# Patient Record
Sex: MALE | Race: WHITE
[De-identification: names, ages, dates, MRNs, and addresses within clinical notes are randomized per-mention and may not be internally consistent; named-entity substitution may affect disease eponyms.]

---

## 2018-11-27 ENCOUNTER — HOSPITAL ENCOUNTER (EMERGENCY)
Dept: HOSPITAL 25 - UCCORT | Age: 43
Discharge: HOME | End: 2018-11-27
Payer: COMMERCIAL

## 2018-11-27 VITALS — DIASTOLIC BLOOD PRESSURE: 79 MMHG | SYSTOLIC BLOOD PRESSURE: 122 MMHG

## 2018-11-27 DIAGNOSIS — F17.210: ICD-10-CM

## 2018-11-27 DIAGNOSIS — A49.02: ICD-10-CM

## 2018-11-27 DIAGNOSIS — Z88.0: ICD-10-CM

## 2018-11-27 DIAGNOSIS — Z79.01: ICD-10-CM

## 2018-11-27 DIAGNOSIS — I48.91: ICD-10-CM

## 2018-11-27 DIAGNOSIS — L02.412: Primary | ICD-10-CM

## 2018-11-27 PROCEDURE — G0463 HOSPITAL OUTPT CLINIC VISIT: HCPCS

## 2018-11-27 PROCEDURE — 10061 I&D ABSCESS COMP/MULTIPLE: CPT

## 2018-11-27 PROCEDURE — 99211 OFF/OP EST MAY X REQ PHY/QHP: CPT

## 2018-11-27 NOTE — UC
UC General HPI





- HPI Summary


HPI Summary: 





abscess in L armpit drained 2 days ago.


area not feeling better and is possible worse.


+MRSA on culture. No hx DM.





- History of Current Complaint


Chief Complaint: UCSkin


Stated Complaint: RECHECK-SKIN COMP


Time Seen by Provider: 11/27/18 15:26


Hx Obtained From: Patient


Pain Intensity: 6


Alleviating: nothing





- Allergy/Home Medications


Allergies/Adverse Reactions: 


 Allergies











Allergy/AdvReac Type Severity Reaction Status Date / Time


 


Penicillins Allergy  Hives Verified 11/27/18 15:06














PMH/Surg Hx/FS Hx/Imm Hx


Cardiovascular History: Atrial Fibrillation - ablation but still on medications 

for now





- Surgical History


Surgical History: Yes


Surgery Procedure, Year, and Place: jaw fxs, cardaic ablasion





- Family History


Known Family History: Positive: Hypertension, Non-Contributory





- Social History


Lives: With Family


Alcohol Use: None


Substance Use Type: None


Smoking Status (MU): Current Every Day Smoker


Type: Smokeless Tobacco


Amount Used/How Often: 1 can/2 days


Length of Time of Smoking/Using Tobacco: since age 29





- Immunization History


Vaccination Up to Date: Yes





Review of Systems


All Other Systems Reviewed And Are Negative: Yes


Constitutional: Positive: Negative


Skin: Positive: Negative


Eyes: Positive: Negative


ENT: Positive: Negative


Respiratory: Positive: Negative


Cardiovascular: Positive: Negative


Gastrointestinal: Positive: Negative


Genitourinary: Positive: Negative


Motor: Positive: Negative


Neurovascular: Positive: Negative


Musculoskeletal: Positive: Negative


Neurological: Positive: Negative


Psychological: Positive: Negative





Physical Exam


Triage Information Reviewed: Yes


Appearance: Well-Appearing


Vital Signs: 


 Initial Vital Signs











Temp  97.9 F   11/27/18 15:07


 


Pulse  81   11/27/18 15:07


 


Resp  16   11/27/18 15:07


 


BP  122/79   11/27/18 15:07


 


Pulse Ox  98   11/27/18 15:07











Vital Signs Reviewed: Yes


Eyes: Positive: Conjunctiva Clear


ENT: Positive: Normal ENT inspection


Neck: Positive: Supple, Nontender, No Lymphadenopathy


Respiratory: Positive: Lungs clear, Normal breath sounds


Cardiovascular: Positive: RRR, No Murmur


Abdomen Description: Positive: Nontender, No Organomegaly, Soft


Bowel Sounds: Positive: Present


Musculoskeletal: Positive: ROM Intact


Neurological: Positive: Alert


Psychological: Positive: Age Appropriate Behavior


Skin Exam: Normal, Other - L axilla red. Prior area of I&D open, pink and not 

draining. Central with tiny opening and is draining puss. 2 more superior spots 

are fluctuant and yellow c/w  abscesses. no streaking. satellite red spots.





Course/Dx





- Course


Course Of Treatment: Procedure: time out. L axilla prep betadine. local  with 1

% Lidocaine in 3 spots of concern. Tip #11 blade used to make superficial stabs 

to each site. small amounts of puss from 3 sites. irrigated with sterile NaCl 

to all 4 ( prior site included) sites. 1/4" gauze placed in all 4 sites. sites 

covered with Bacitracin and single bulky gauze sterile dressing. only scant 

bleeding. sterile technique used. pt tolerated well.  on Bactrim and will 

continue, culture is MRSA +





- Diagnoses


Provider Diagnosis: 


 Abscess of axilla








Discharge





- Sign-Out/Discharge


Documenting (check all that apply): Patient Departure


All imaging exams completed and their final reports reviewed: No Studies





- Discharge Plan


Condition: Stable


Disposition: HOME


Patient Education Materials:  Abscess Follow-up (ED)


Referrals: 


[facility] [Medical Doctor] - 2 Days


Additional Instructions: 


CONTINUE THE BACTRIM AS DIRECTED.





- Billing Disposition and Condition


Condition: STABLE


Disposition: Home





- Attestation Statements


Provider Attestation: 





I was available for consult. This patient was seen by the [facility]. The patient was 

not presented to, seen by, or examined by me. -Billy

## 2018-12-21 ENCOUNTER — HOSPITAL ENCOUNTER (EMERGENCY)
Dept: HOSPITAL 25 - UCCORT | Age: 43
Discharge: HOME | End: 2018-12-21
Payer: COMMERCIAL

## 2018-12-21 VITALS — SYSTOLIC BLOOD PRESSURE: 125 MMHG | DIASTOLIC BLOOD PRESSURE: 75 MMHG
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
## 2018-12-21 DIAGNOSIS — Z88.0: ICD-10-CM

## 2018-12-21 DIAGNOSIS — F17.210: ICD-10-CM

## 2018-12-21 DIAGNOSIS — S39.011A: Primary | ICD-10-CM

## 2018-12-21 PROCEDURE — 99213 OFFICE O/P EST LOW 20 MIN: CPT

## 2018-12-21 PROCEDURE — G0463 HOSPITAL OUTPT CLINIC VISIT: HCPCS

## 2018-12-21 NOTE — UC
General HPI





- HPI Summary


HPI Summary: 





"I think I have a groin pull".


2 days ago, while demonstrating a squat type move at his gym, pt got a sudden 

pain in his R groin.


he has a hx of the same.


at times the pain gets so bad it shoots into his leg.


pt denies any testicular pain or swelling.


self tx with tylenol. no relief. unable to take nsaids.





- History of Current Complaint


Chief Complaint: UCLowerExtremity


Stated Complaint: GROIN PAIN


Time Seen by Provider: 12/21/18 14:09


Hx Obtained From: Patient


Onset/Duration: Sudden Onset


Pain Intensity: 8


Aggravating: any type of leg movement


Associated Signs & Symptoms: Positive: Other - no joint pain.  Negative: 

Abdominal Pain





- Allergy/Home Medications


Allergies/Adverse Reactions: 


 Allergies











Allergy/AdvReac Type Severity Reaction Status Date / Time


 


Penicillins Allergy  Hives Verified 12/21/18 14:01














PMH/Surg Hx/FS Hx/Imm Hx





- Additional Past Medical History


Additional PMH: 





a-fib with ablation





- Surgical History


Surgical History: Yes


Surgery Procedure, Year, and Place: jaw fxs, cardaic ablasion





- Family History


Known Family History: Positive: Hypertension, Non-Contributory





- Social History


Occupation: Employed Full-time


Lives: With Family


Alcohol Use: None


Substance Use Type: None


Smoking Status (MU): Current Every Day Smoker


Type: Smokeless Tobacco


Amount Used/How Often: 1 can/2 days


Length of Time of Smoking/Using Tobacco: since age 29





- Immunization History


Vaccination Up to Date: Yes





Review of Systems


All Other Systems Reviewed And Are Negative: Yes


Constitutional: Positive: Negative


Skin: Positive: Negative


Eyes: Positive: Negative


ENT: Positive: Negative


Respiratory: Positive: Negative


Cardiovascular: Positive: Negative


Gastrointestinal: Positive: Negative


Genitourinary: Positive: Negative


Motor: Positive: Negative


Neurovascular: Positive: Negative


Musculoskeletal: Positive: Other: - R shawn pain


Neurological: Positive: Negative


Psychological: Positive: Negative


Is Patient Immunocompromised?: No





Physical Exam


Triage Information Reviewed: Yes


Appearance: Well-Appearing, Pain Distress - when walks


Vital Signs: 


 Initial Vital Signs











Temp  97.8 F   12/21/18 14:02


 


Pulse  72   12/21/18 14:02


 


Resp  18   12/21/18 14:02


 


BP  125/75   12/21/18 14:02


 


Pulse Ox  99   12/21/18 14:02











Vital Signs Reviewed: Yes


Eyes: Positive: Conjunctiva Clear


ENT: Positive: Normal ENT inspection


Neck: Positive: Supple, Nontender


Respiratory: Positive: Lungs clear, Normal breath sounds


Cardiovascular: Positive: RRR, No Murmur


Abdomen Description: Positive: Nontender, No Organomegaly, Soft, Other: - 

Strong femoral pulses. No inguinal adenopathy or rash..  Negative: Distended, 

Guarding


Bowel Sounds: Positive: Present


Male Genital Exam: Positive: Normal Genitalia.  Negative: No Hernia, Scrotum 

Tenderness (R), Scrotum Tenderness (L), Testicular Tenderness (R), Testicular 

Tenderness (L)


Musculoskeletal: Positive: Other: - BLE's compared: no gross deformity, 

swelling or discoloration. Strong symmetrical pedal pulses. No calf pain or 

swelling x 2. R upper inner thigh mm's are very tender and any ROM reproduces 

and worsens his pain. pelvic girdle/hips are non tender to palpation





Course/Dx





- Differential Dx - Multi-Symptom


Differential Diagnoses: Other - gu exam unremarkable, no concern for testicular 

torsion. strong symmetrical pulses, no concern for arterial occlusion/

insufficiency. no bony deformity or tendernsss thus no concern for fx. exam is c

/w a mm strain.





- Diagnoses


Provider Diagnosis: 


 Strain of groin








Discharge





- Sign-Out/Discharge


Documenting (check all that apply): Patient Departure


All imaging exams completed and their final reports reviewed: No Studies





- Discharge Plan


Condition: Critical


Disposition: HOME


Prescriptions: 


Hydrocodone/Acetaminophen [Norco 5-325 Tablet] 1 each PO Q6HR PRN #12 tablet 

MDD 4


 PRN Reason: Pain


Patient Education Materials:  Groin Strain (ED)


Referrals: 


Jg Barnard MD [Medical Doctor] - 5 Days


Additional Instructions: 


ACE R UPPER THIGH DURING DAY FOR COMFORT. REMOVE AT BEDTIME.





- Billing Disposition and Condition


Condition: CRITICAL


Disposition: Home